# Patient Record
Sex: FEMALE | Race: WHITE | NOT HISPANIC OR LATINO | Employment: FULL TIME | URBAN - METROPOLITAN AREA
[De-identification: names, ages, dates, MRNs, and addresses within clinical notes are randomized per-mention and may not be internally consistent; named-entity substitution may affect disease eponyms.]

---

## 2023-07-21 PROBLEM — Z86.010 HISTORY OF COLONIC POLYPS: Status: ACTIVE | Noted: 2023-07-21

## 2023-07-21 PROBLEM — K58.9 IBS (IRRITABLE BOWEL SYNDROME): Status: ACTIVE | Noted: 2023-07-21

## 2023-07-21 PROBLEM — Z86.0100 HISTORY OF COLONIC POLYPS: Status: ACTIVE | Noted: 2023-07-21

## 2023-07-21 PROBLEM — M79.642 HAND PAIN, LEFT: Status: ACTIVE | Noted: 2023-07-21

## 2023-07-21 PROBLEM — M79.89 SWELLING OF RIGHT LOWER EXTREMITY: Status: ACTIVE | Noted: 2023-07-21

## 2023-07-21 PROBLEM — M75.82 TENDINITIS OF LEFT ROTATOR CUFF: Status: ACTIVE | Noted: 2023-07-21

## 2023-07-21 PROBLEM — I73.00 RAYNAUD PHENOMENON: Status: ACTIVE | Noted: 2023-07-21

## 2023-07-21 PROBLEM — R31.9 HEMATURIA, UNSPECIFIED: Status: ACTIVE | Noted: 2023-07-21

## 2023-07-21 PROBLEM — M20.002 DEVIATION OF FINGER OF LEFT HAND: Status: ACTIVE | Noted: 2023-07-21

## 2023-07-21 PROBLEM — M25.511 RIGHT SHOULDER PAIN: Status: ACTIVE | Noted: 2023-07-21

## 2023-07-21 PROBLEM — R20.0 RIGHT LEG NUMBNESS: Status: ACTIVE | Noted: 2023-07-21

## 2023-08-15 ASSESSMENT — PROMIS GLOBAL HEALTH SCALE
RATE_QUALITY_OF_LIFE: VERY GOOD
RATE_GENERAL_HEALTH: VERY GOOD
RATE_AVERAGE_FATIGUE: MILD
CARRYOUT_SOCIAL_ACTIVITIES: VERY GOOD
RATE_SOCIAL_SATISFACTION: VERY GOOD
EMOTIONAL_PROBLEMS: RARELY
RATE_MENTAL_HEALTH: VERY GOOD
RATE_AVERAGE_PAIN: 2
CARRYOUT_PHYSICAL_ACTIVITIES: COMPLETELY
RATE_PHYSICAL_HEALTH: VERY GOOD

## 2023-08-17 ENCOUNTER — OFFICE VISIT (OUTPATIENT)
Dept: PRIMARY CARE | Facility: CLINIC | Age: 53
End: 2023-08-17
Payer: COMMERCIAL

## 2023-08-17 VITALS
HEART RATE: 64 BPM | SYSTOLIC BLOOD PRESSURE: 122 MMHG | BODY MASS INDEX: 23.63 KG/M2 | HEIGHT: 66 IN | WEIGHT: 147 LBS | RESPIRATION RATE: 19 BRPM | DIASTOLIC BLOOD PRESSURE: 70 MMHG

## 2023-08-17 DIAGNOSIS — Z86.010 HISTORY OF COLONIC POLYPS: ICD-10-CM

## 2023-08-17 DIAGNOSIS — Z00.00 ROUTINE GENERAL MEDICAL EXAMINATION AT A HEALTH CARE FACILITY: Primary | ICD-10-CM

## 2023-08-17 DIAGNOSIS — E78.2 MIXED HYPERLIPIDEMIA: ICD-10-CM

## 2023-08-17 DIAGNOSIS — R20.0 RIGHT LEG NUMBNESS: ICD-10-CM

## 2023-08-17 DIAGNOSIS — Z13.29 SCREENING FOR THYROID DISORDER: ICD-10-CM

## 2023-08-17 PROBLEM — M25.511 RIGHT SHOULDER PAIN: Status: RESOLVED | Noted: 2023-07-21 | Resolved: 2023-08-17

## 2023-08-17 PROBLEM — M75.82 TENDINITIS OF LEFT ROTATOR CUFF: Status: RESOLVED | Noted: 2023-07-21 | Resolved: 2023-08-17

## 2023-08-17 PROBLEM — M79.642 HAND PAIN, LEFT: Status: RESOLVED | Noted: 2023-07-21 | Resolved: 2023-08-17

## 2023-08-17 PROBLEM — M79.89 SWELLING OF RIGHT LOWER EXTREMITY: Status: RESOLVED | Noted: 2023-07-21 | Resolved: 2023-08-17

## 2023-08-17 PROCEDURE — 1036F TOBACCO NON-USER: CPT | Performed by: INTERNAL MEDICINE

## 2023-08-17 PROCEDURE — 99396 PREV VISIT EST AGE 40-64: CPT | Performed by: INTERNAL MEDICINE

## 2023-08-17 PROCEDURE — 85027 COMPLETE CBC AUTOMATED: CPT

## 2023-08-17 PROCEDURE — 81001 URINALYSIS AUTO W/SCOPE: CPT

## 2023-08-17 PROCEDURE — 80053 COMPREHEN METABOLIC PANEL: CPT

## 2023-08-17 PROCEDURE — 84443 ASSAY THYROID STIM HORMONE: CPT

## 2023-08-17 PROCEDURE — 80061 LIPID PANEL: CPT

## 2023-08-17 ASSESSMENT — ENCOUNTER SYMPTOMS
HALLUCINATIONS: 0
POLYPHAGIA: 0
CONFUSION: 0
HEMATURIA: 0
CHOKING: 0
CONSTIPATION: 0
POLYDIPSIA: 0
NECK PAIN: 0
DIFFICULTY URINATING: 0
UNEXPECTED WEIGHT CHANGE: 0
CHEST TIGHTNESS: 0
BACK PAIN: 0
NECK STIFFNESS: 0
FLANK PAIN: 0
DIAPHORESIS: 0
FATIGUE: 0
ACTIVITY CHANGE: 0
ABDOMINAL PAIN: 0
EYE DISCHARGE: 0
AGITATION: 0
VOICE CHANGE: 0
HYPERACTIVE: 0
PHOTOPHOBIA: 0
HEADACHES: 0
WHEEZING: 0
COLOR CHANGE: 0
SINUS PAIN: 0
RHINORRHEA: 0
DYSPHORIC MOOD: 0
SHORTNESS OF BREATH: 0
SINUS PRESSURE: 0
STRIDOR: 0
RECTAL PAIN: 0
NUMBNESS: 0
DIARRHEA: 0
VOMITING: 0
FACIAL SWELLING: 0
FACIAL ASYMMETRY: 0
FEVER: 0
APNEA: 0
EYE ITCHING: 0
DYSURIA: 0
TROUBLE SWALLOWING: 0
NAUSEA: 0
ABDOMINAL DISTENTION: 0
MYALGIAS: 0
LIGHT-HEADEDNESS: 0
FREQUENCY: 0
SPEECH DIFFICULTY: 0
ADENOPATHY: 0
WEAKNESS: 0
BLOOD IN STOOL: 0
BRUISES/BLEEDS EASILY: 0
ARTHRALGIAS: 1
DIZZINESS: 0
APPETITE CHANGE: 0
DECREASED CONCENTRATION: 0
ANAL BLEEDING: 0
PALPITATIONS: 0
EYE REDNESS: 0
JOINT SWELLING: 0
COUGH: 0
SORE THROAT: 0
SEIZURES: 0
SLEEP DISTURBANCE: 0
WOUND: 0
CHILLS: 0
TREMORS: 0
NERVOUS/ANXIOUS: 0
EYE PAIN: 0

## 2023-08-17 NOTE — ASSESSMENT & PLAN NOTE
Last colonoscopy was in 2022  She has family history of colorectal cancer  She goes every 3 years
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
## 2023-08-17 NOTE — PATIENT INSTRUCTIONS
We did blood work today and will call with any abnormal results  Continue to see GYN annually and have mammograms as directed  Continue healthy diet and regular exercise for general health  Flu and covid boosters come fall if interested  Consider 2 part Shingles vaccine   Follow up here in 1 year-sooner if needed

## 2023-08-18 LAB
ALANINE AMINOTRANSFERASE (SGPT) (U/L) IN SER/PLAS: 13 U/L (ref 7–45)
ALBUMIN (G/DL) IN SER/PLAS: 4.9 G/DL (ref 3.4–5)
ALKALINE PHOSPHATASE (U/L) IN SER/PLAS: 64 U/L (ref 33–110)
ANION GAP IN SER/PLAS: 13 MMOL/L (ref 10–20)
APPEARANCE, URINE: CLEAR
ASPARTATE AMINOTRANSFERASE (SGOT) (U/L) IN SER/PLAS: 16 U/L (ref 9–39)
BILIRUBIN TOTAL (MG/DL) IN SER/PLAS: 1.8 MG/DL (ref 0–1.2)
BILIRUBIN, URINE: NEGATIVE
BLOOD, URINE: ABNORMAL
CALCIUM (MG/DL) IN SER/PLAS: 9.6 MG/DL (ref 8.6–10.6)
CARBON DIOXIDE, TOTAL (MMOL/L) IN SER/PLAS: 28 MMOL/L (ref 21–32)
CHLORIDE (MMOL/L) IN SER/PLAS: 102 MMOL/L (ref 98–107)
CHOLESTEROL (MG/DL) IN SER/PLAS: 180 MG/DL (ref 0–199)
CHOLESTEROL IN HDL (MG/DL) IN SER/PLAS: 50.2 MG/DL
CHOLESTEROL/HDL RATIO: 3.6
COLOR, URINE: COLORLESS
CREATININE (MG/DL) IN SER/PLAS: 0.88 MG/DL (ref 0.5–1.05)
ERYTHROCYTE DISTRIBUTION WIDTH (RATIO) BY AUTOMATED COUNT: 12.5 % (ref 11.5–14.5)
ERYTHROCYTE MEAN CORPUSCULAR HEMOGLOBIN CONCENTRATION (G/DL) BY AUTOMATED: 31.7 G/DL (ref 32–36)
ERYTHROCYTE MEAN CORPUSCULAR VOLUME (FL) BY AUTOMATED COUNT: 90 FL (ref 80–100)
ERYTHROCYTES (10*6/UL) IN BLOOD BY AUTOMATED COUNT: 4.82 X10E12/L (ref 4–5.2)
GFR FEMALE: 80 ML/MIN/1.73M2
GLUCOSE (MG/DL) IN SER/PLAS: 79 MG/DL (ref 74–99)
GLUCOSE, URINE: NEGATIVE MG/DL
HEMATOCRIT (%) IN BLOOD BY AUTOMATED COUNT: 43.5 % (ref 36–46)
HEMOGLOBIN (G/DL) IN BLOOD: 13.8 G/DL (ref 12–16)
KETONES, URINE: NEGATIVE MG/DL
LDL: 112 MG/DL (ref 0–99)
LEUKOCYTE ESTERASE, URINE: NEGATIVE
LEUKOCYTES (10*3/UL) IN BLOOD BY AUTOMATED COUNT: 6.6 X10E9/L (ref 4.4–11.3)
NITRITE, URINE: NEGATIVE
NRBC (PER 100 WBCS) BY AUTOMATED COUNT: 0 /100 WBC (ref 0–0)
PH, URINE: 8 (ref 5–8)
PLATELETS (10*3/UL) IN BLOOD AUTOMATED COUNT: 276 X10E9/L (ref 150–450)
POTASSIUM (MMOL/L) IN SER/PLAS: 4.4 MMOL/L (ref 3.5–5.3)
PROTEIN TOTAL: 7.1 G/DL (ref 6.4–8.2)
PROTEIN, URINE: NEGATIVE MG/DL
RBC, URINE: 1 /HPF (ref 0–5)
SODIUM (MMOL/L) IN SER/PLAS: 139 MMOL/L (ref 136–145)
SPECIFIC GRAVITY, URINE: 1 (ref 1–1.03)
THYROTROPIN (MIU/L) IN SER/PLAS BY DETECTION LIMIT <= 0.05 MIU/L: 1.58 MIU/L (ref 0.44–3.98)
TRIGLYCERIDE (MG/DL) IN SER/PLAS: 91 MG/DL (ref 0–149)
UREA NITROGEN (MG/DL) IN SER/PLAS: 15 MG/DL (ref 6–23)
UROBILINOGEN, URINE: <2 MG/DL (ref 0–1.9)
VLDL: 18 MG/DL (ref 0–40)
WBC, URINE: <1 /HPF (ref 0–5)

## 2024-06-12 ENCOUNTER — TELEPHONE (OUTPATIENT)
Dept: OBSTETRICS AND GYNECOLOGY | Facility: CLINIC | Age: 54
End: 2024-06-12

## 2024-06-12 ENCOUNTER — APPOINTMENT (OUTPATIENT)
Dept: OBSTETRICS AND GYNECOLOGY | Facility: CLINIC | Age: 54
End: 2024-06-12
Payer: COMMERCIAL

## 2024-06-12 NOTE — TELEPHONE ENCOUNTER
Called patient to reschedule appointment. Provider is in delivery. Left voicemail.    English Orozco

## 2024-08-16 ASSESSMENT — PROMIS GLOBAL HEALTH SCALE
CARRYOUT_SOCIAL_ACTIVITIES: EXCELLENT
RATE_GENERAL_HEALTH: VERY GOOD
RATE_AVERAGE_PAIN: 0
RATE_MENTAL_HEALTH: EXCELLENT
RATE_SOCIAL_SATISFACTION: EXCELLENT
EMOTIONAL_PROBLEMS: NEVER
RATE_PHYSICAL_HEALTH: VERY GOOD
RATE_QUALITY_OF_LIFE: VERY GOOD
CARRYOUT_PHYSICAL_ACTIVITIES: COMPLETELY

## 2024-08-22 ENCOUNTER — APPOINTMENT (OUTPATIENT)
Dept: PRIMARY CARE | Facility: CLINIC | Age: 54
End: 2024-08-22
Payer: COMMERCIAL

## 2024-08-22 ENCOUNTER — LAB (OUTPATIENT)
Dept: LAB | Facility: LAB | Age: 54
End: 2024-08-22
Payer: COMMERCIAL

## 2024-08-22 VITALS
HEART RATE: 68 BPM | BODY MASS INDEX: 24.92 KG/M2 | HEIGHT: 67 IN | RESPIRATION RATE: 12 BRPM | DIASTOLIC BLOOD PRESSURE: 82 MMHG | SYSTOLIC BLOOD PRESSURE: 130 MMHG | WEIGHT: 158.8 LBS

## 2024-08-22 DIAGNOSIS — E78.2 MIXED HYPERLIPIDEMIA: ICD-10-CM

## 2024-08-22 DIAGNOSIS — R31.21 ASYMPTOMATIC MICROSCOPIC HEMATURIA: ICD-10-CM

## 2024-08-22 DIAGNOSIS — Z12.31 ENCOUNTER FOR SCREENING MAMMOGRAM FOR MALIGNANT NEOPLASM OF BREAST: ICD-10-CM

## 2024-08-22 DIAGNOSIS — Z00.00 ROUTINE GENERAL MEDICAL EXAMINATION AT A HEALTH CARE FACILITY: ICD-10-CM

## 2024-08-22 DIAGNOSIS — Z13.29 SCREENING FOR THYROID DISORDER: ICD-10-CM

## 2024-08-22 DIAGNOSIS — Z00.00 ROUTINE GENERAL MEDICAL EXAMINATION AT A HEALTH CARE FACILITY: Primary | ICD-10-CM

## 2024-08-22 DIAGNOSIS — Z86.010 HISTORY OF COLONIC POLYPS: ICD-10-CM

## 2024-08-22 LAB
ALBUMIN SERPL BCP-MCNC: 4.5 G/DL (ref 3.4–5)
ALP SERPL-CCNC: 64 U/L (ref 33–110)
ALT SERPL W P-5'-P-CCNC: 13 U/L (ref 7–45)
ANION GAP SERPL CALC-SCNC: 12 MMOL/L (ref 10–20)
APPEARANCE UR: CLEAR
AST SERPL W P-5'-P-CCNC: 15 U/L (ref 9–39)
BILIRUB SERPL-MCNC: 1.4 MG/DL (ref 0–1.2)
BILIRUB UR STRIP.AUTO-MCNC: NEGATIVE MG/DL
BUN SERPL-MCNC: 21 MG/DL (ref 6–23)
CALCIUM SERPL-MCNC: 9.7 MG/DL (ref 8.6–10.6)
CHLORIDE SERPL-SCNC: 101 MMOL/L (ref 98–107)
CHOLEST SERPL-MCNC: 198 MG/DL (ref 0–199)
CHOLESTEROL/HDL RATIO: 3.8
CO2 SERPL-SCNC: 30 MMOL/L (ref 21–32)
COLOR UR: COLORLESS
CREAT SERPL-MCNC: 0.78 MG/DL (ref 0.5–1.05)
EGFRCR SERPLBLD CKD-EPI 2021: >90 ML/MIN/1.73M*2
ERYTHROCYTE [DISTWIDTH] IN BLOOD BY AUTOMATED COUNT: 12.2 % (ref 11.5–14.5)
GLUCOSE SERPL-MCNC: 89 MG/DL (ref 74–99)
GLUCOSE UR STRIP.AUTO-MCNC: NORMAL MG/DL
HCT VFR BLD AUTO: 41.6 % (ref 36–46)
HDLC SERPL-MCNC: 52.8 MG/DL
HGB BLD-MCNC: 13.6 G/DL (ref 12–16)
KETONES UR STRIP.AUTO-MCNC: NEGATIVE MG/DL
LDLC SERPL CALC-MCNC: 126 MG/DL
LEUKOCYTE ESTERASE UR QL STRIP.AUTO: NEGATIVE
MCH RBC QN AUTO: 28.9 PG (ref 26–34)
MCHC RBC AUTO-ENTMCNC: 32.7 G/DL (ref 32–36)
MCV RBC AUTO: 88 FL (ref 80–100)
NITRITE UR QL STRIP.AUTO: NEGATIVE
NON HDL CHOLESTEROL: 145 MG/DL (ref 0–149)
NRBC BLD-RTO: 0 /100 WBCS (ref 0–0)
PH UR STRIP.AUTO: 7 [PH]
PLATELET # BLD AUTO: 282 X10*3/UL (ref 150–450)
POTASSIUM SERPL-SCNC: 4.1 MMOL/L (ref 3.5–5.3)
PROT SERPL-MCNC: 7.2 G/DL (ref 6.4–8.2)
PROT UR STRIP.AUTO-MCNC: NEGATIVE MG/DL
RBC # BLD AUTO: 4.71 X10*6/UL (ref 4–5.2)
RBC # UR STRIP.AUTO: ABNORMAL /UL
RBC #/AREA URNS AUTO: NORMAL /HPF
SODIUM SERPL-SCNC: 139 MMOL/L (ref 136–145)
SP GR UR STRIP.AUTO: 1
TRIGL SERPL-MCNC: 96 MG/DL (ref 0–149)
TSH SERPL-ACNC: 1.39 MIU/L (ref 0.44–3.98)
UROBILINOGEN UR STRIP.AUTO-MCNC: NORMAL MG/DL
VLDL: 19 MG/DL (ref 0–40)
WBC # BLD AUTO: 7.2 X10*3/UL (ref 4.4–11.3)
WBC #/AREA URNS AUTO: NORMAL /HPF

## 2024-08-22 PROCEDURE — 3008F BODY MASS INDEX DOCD: CPT | Performed by: INTERNAL MEDICINE

## 2024-08-22 PROCEDURE — 1036F TOBACCO NON-USER: CPT | Performed by: INTERNAL MEDICINE

## 2024-08-22 PROCEDURE — 36415 COLL VENOUS BLD VENIPUNCTURE: CPT

## 2024-08-22 PROCEDURE — 85027 COMPLETE CBC AUTOMATED: CPT

## 2024-08-22 PROCEDURE — 80053 COMPREHEN METABOLIC PANEL: CPT

## 2024-08-22 PROCEDURE — 80061 LIPID PANEL: CPT

## 2024-08-22 PROCEDURE — 81001 URINALYSIS AUTO W/SCOPE: CPT

## 2024-08-22 PROCEDURE — 99396 PREV VISIT EST AGE 40-64: CPT | Performed by: INTERNAL MEDICINE

## 2024-08-22 PROCEDURE — 84443 ASSAY THYROID STIM HORMONE: CPT

## 2024-08-22 ASSESSMENT — ENCOUNTER SYMPTOMS
STRIDOR: 0
JOINT SWELLING: 0
BACK PAIN: 0
ADENOPATHY: 0
CHILLS: 0
EYE PAIN: 0
BLOOD IN STOOL: 0
COLOR CHANGE: 0
DIFFICULTY URINATING: 0
TROUBLE SWALLOWING: 0
ACTIVITY CHANGE: 0
POLYDIPSIA: 0
NERVOUS/ANXIOUS: 0
AGITATION: 0
DECREASED CONCENTRATION: 0
APPETITE CHANGE: 0
FEVER: 0
RECTAL PAIN: 0
DIARRHEA: 0
PHOTOPHOBIA: 0
FREQUENCY: 0
ABDOMINAL PAIN: 0
NECK STIFFNESS: 0
FLANK PAIN: 0
HEADACHES: 0
MYALGIAS: 0
EYE REDNESS: 0
WOUND: 0
CONSTIPATION: 0
ANAL BLEEDING: 0
HEMATURIA: 0
SINUS PAIN: 0
EYE ITCHING: 0
CONFUSION: 0
DIZZINESS: 0
SLEEP DISTURBANCE: 0
SINUS PRESSURE: 0
FATIGUE: 0
SHORTNESS OF BREATH: 0
SPEECH DIFFICULTY: 0
UNEXPECTED WEIGHT CHANGE: 0
SORE THROAT: 0
PALPITATIONS: 0
RHINORRHEA: 0
BRUISES/BLEEDS EASILY: 0
POLYPHAGIA: 0
ARTHRALGIAS: 0
WHEEZING: 0
DIAPHORESIS: 0
CHOKING: 0
FACIAL SWELLING: 0
APNEA: 0
CHEST TIGHTNESS: 0
HALLUCINATIONS: 0
VOMITING: 0
VOICE CHANGE: 0
LIGHT-HEADEDNESS: 0
EYE DISCHARGE: 0
HYPERACTIVE: 0
NECK PAIN: 0
COUGH: 0
TREMORS: 0
NAUSEA: 0
SEIZURES: 0
DYSPHORIC MOOD: 0
ABDOMINAL DISTENTION: 0
DYSURIA: 0
NUMBNESS: 1
FACIAL ASYMMETRY: 0
WEAKNESS: 0

## 2024-08-22 NOTE — PROGRESS NOTES
Subjective   Patient ID: Bre Suarez is a 53 y.o. female who presents for Annual Exam.    HPI  Pt here for full physical.  Her weight is up due to more stress at home- had 2 major surgeries that caused her to have to caretake/not be able to exercise.      She had normal mammogram in March of 2023.  No breast issues.    She sees Dr. Michaels for GYN care-last pap was normal in 2022.  She has appt in 9/18 for her annual.  No pelvic or urinary issues.      She had colonoscopy in 2022 and had large polyps-noted to return in 3 years.  No bowel or other GI issues.      She sees derm regularly-had visit for screening in April.  Does have few lesions burned off each time.        Review of Systems   Constitutional:  Negative for activity change, appetite change, chills, diaphoresis, fatigue, fever and unexpected weight change.   HENT:  Negative for congestion, dental problem, drooling, ear discharge, ear pain, facial swelling, hearing loss, mouth sores, nosebleeds, postnasal drip, rhinorrhea, sinus pressure, sinus pain, sneezing, sore throat, tinnitus, trouble swallowing and voice change.    Eyes:  Negative for photophobia, pain, discharge, redness, itching and visual disturbance (wears glasses-due for exam).   Respiratory:  Negative for apnea, cough, choking, chest tightness, shortness of breath, wheezing and stridor.    Cardiovascular:  Negative for chest pain, palpitations and leg swelling.   Gastrointestinal:  Negative for abdominal distention, abdominal pain, anal bleeding, blood in stool, constipation, diarrhea, nausea, rectal pain and vomiting.   Endocrine: Negative for cold intolerance, heat intolerance, polydipsia, polyphagia and polyuria.   Genitourinary:  Negative for decreased urine volume, difficulty urinating, dyspareunia, dysuria, enuresis, flank pain, frequency, genital sores, hematuria, menstrual problem, pelvic pain, urgency, vaginal bleeding, vaginal discharge and vaginal pain.  "  Musculoskeletal:  Negative for arthralgias, back pain, gait problem, joint swelling, myalgias, neck pain and neck stiffness.   Skin:  Negative for color change, pallor, rash and wound.   Allergic/Immunologic: Negative for environmental allergies, food allergies and immunocompromised state.   Neurological:  Positive for numbness (right leg at times-chronic from accident in past). Negative for dizziness, tremors, seizures, syncope, facial asymmetry, speech difficulty, weakness, light-headedness and headaches.   Hematological:  Negative for adenopathy. Does not bruise/bleed easily.   Psychiatric/Behavioral:  Negative for agitation, behavioral problems, confusion, decreased concentration, dysphoric mood, hallucinations, self-injury, sleep disturbance and suicidal ideas. The patient is not nervous/anxious and is not hyperactive.        Objective   /82 (BP Location: Right arm, Patient Position: Sitting)   Pulse 68   Resp 12   Ht 1.692 m (5' 6.6\")   Wt 72 kg (158 lb 12.8 oz)   BMI 25.17 kg/m²      Physical Exam  Constitutional:       Appearance: Normal appearance.   HENT:      Head: Normocephalic and atraumatic.      Right Ear: Tympanic membrane, ear canal and external ear normal. There is no impacted cerumen.      Left Ear: Tympanic membrane, ear canal and external ear normal. There is no impacted cerumen.      Nose: Nose normal. No congestion or rhinorrhea.      Mouth/Throat:      Mouth: Mucous membranes are moist.      Pharynx: Oropharynx is clear. No oropharyngeal exudate or posterior oropharyngeal erythema.   Eyes:      Extraocular Movements: Extraocular movements intact.      Conjunctiva/sclera: Conjunctivae normal.      Pupils: Pupils are equal, round, and reactive to light.   Neck:      Vascular: No carotid bruit.   Cardiovascular:      Rate and Rhythm: Normal rate and regular rhythm.      Pulses: Normal pulses.      Heart sounds: Normal heart sounds. No murmur heard.  Pulmonary:      Effort: Pulmonary " effort is normal. No respiratory distress.      Breath sounds: Normal breath sounds. No wheezing, rhonchi or rales.   Abdominal:      General: Abdomen is flat. Bowel sounds are normal. There is no distension.      Palpations: Abdomen is soft.      Tenderness: There is no abdominal tenderness.      Hernia: No hernia is present.   Musculoskeletal:         General: No swelling or tenderness. Normal range of motion.      Cervical back: Normal range of motion and neck supple.      Right lower leg: No edema.      Left lower leg: No edema.   Lymphadenopathy:      Cervical: No cervical adenopathy.   Skin:     General: Skin is warm and dry.      Findings: No lesion or rash.   Neurological:      General: No focal deficit present.      Mental Status: She is alert and oriented to person, place, and time.      Cranial Nerves: No cranial nerve deficit.      Sensory: No sensory deficit.      Motor: No weakness.   Psychiatric:         Mood and Affect: Mood normal.         Behavior: Behavior normal.         Thought Content: Thought content normal.         Judgment: Judgment normal.         Assessment/Plan   Problem List Items Addressed This Visit       Hematuria, unspecified     Will have her do repeat urine today to assess  Asymptomatic          Relevant Orders    Urinalysis with Reflex Microscopic    History of colonic polyps     Repeat colonoscopy due 7/2025         Mixed hyperlipidemia     Not on meds  Repeat lipids today  Diet modifications          Relevant Orders    Lipid Panel     Other Visit Diagnoses       Routine general medical examination at a health care facility    -  Primary    Relevant Orders    Comprehensive Metabolic Panel    CBC    Follow Up In Primary Care - Health Maintenance    Encounter for screening mammogram for malignant neoplasm of breast        Relevant Orders    BI mammo bilateral screening tomosynthesis    Screening for thyroid disorder        Relevant Orders    TSH with reflex to Free T4 if abnormal

## 2024-08-22 NOTE — PATIENT INSTRUCTIONS
Get fasting blood work and urine done today-we will call if anything is abnormal  Consider the 2 part shingles vaccine-can have this done here  Continue healthy diet and get back into regular exercise-goal of 150 minutes/week  See GYN in 9/2024 when scheduled  Call and schedule mammogram now  Colonoscopy will be due next year (2025 in July)  Consider flu/covid boosters come fall if interested  Follow up here in 1 year

## 2024-08-29 ENCOUNTER — HOSPITAL ENCOUNTER (OUTPATIENT)
Dept: RADIOLOGY | Facility: CLINIC | Age: 54
Discharge: HOME | End: 2024-08-29
Payer: COMMERCIAL

## 2024-08-29 DIAGNOSIS — Z12.31 ENCOUNTER FOR SCREENING MAMMOGRAM FOR MALIGNANT NEOPLASM OF BREAST: ICD-10-CM

## 2024-08-29 PROCEDURE — 77063 BREAST TOMOSYNTHESIS BI: CPT | Performed by: RADIOLOGY

## 2024-08-29 PROCEDURE — 77067 SCR MAMMO BI INCL CAD: CPT

## 2024-08-29 PROCEDURE — 77067 SCR MAMMO BI INCL CAD: CPT | Performed by: RADIOLOGY

## 2024-09-18 ENCOUNTER — APPOINTMENT (OUTPATIENT)
Dept: OBSTETRICS AND GYNECOLOGY | Facility: CLINIC | Age: 54
End: 2024-09-18
Payer: COMMERCIAL

## 2024-09-18 VITALS
HEIGHT: 67 IN | BODY MASS INDEX: 25.11 KG/M2 | SYSTOLIC BLOOD PRESSURE: 120 MMHG | DIASTOLIC BLOOD PRESSURE: 82 MMHG | WEIGHT: 160 LBS

## 2024-09-18 DIAGNOSIS — Z01.419 ENCOUNTER FOR GYNECOLOGICAL EXAMINATION WITHOUT ABNORMAL FINDING: Primary | ICD-10-CM

## 2024-09-18 PROCEDURE — 3008F BODY MASS INDEX DOCD: CPT | Performed by: OBSTETRICS & GYNECOLOGY

## 2024-09-18 PROCEDURE — 1036F TOBACCO NON-USER: CPT | Performed by: OBSTETRICS & GYNECOLOGY

## 2024-09-18 PROCEDURE — 99396 PREV VISIT EST AGE 40-64: CPT | Performed by: OBSTETRICS & GYNECOLOGY

## 2024-09-18 NOTE — PROGRESS NOTES
Subjective   Bre Suarez is a 53 y.o. female here for a routine exam.  She has no postmenopausal bleeding or pelvic pain.  No dysuria, no change in bowel habits or vaginal discharge.  She is current on her colonoscopy, done every 3 years due to polyps.    Personal health questionnaire reviewed: yes.     Gynecologic History  Patient's last menstrual period was 2018.  Contraception: post menopausal status  Last Pap: 22. Results were: normal  Last mammogram: 24. Results were: normal    Obstetric History  OB History    Para Term  AB Living   1 1       1   SAB IAB Ectopic Multiple Live Births           1      # Outcome Date GA Lbr Meir/2nd Weight Sex Type Anes PTL Lv   1 Para                Objective   Constitutional: Alert and in no acute distress. Well developed, well nourished.   Head and Face: Head and face: Normal.    Eyes: Normal external exam - nonicteric sclera, extraocular movements intact (EOMI) and no ptosis.   Neck: No neck asymmetry. Supple. Thyroid not enlarged and there were no palpable thyroid nodules.    Pulmonary: No respiratory distress.   Chest: Breasts: Normal appearance, no nipple discharge and no skin changes. Palpation of breasts and axillae: No palpable mass and no axillary lymphadenopathy.   Abdomen: Soft nontender; no abdominal mass palpated. No organomegaly. No hernias.   Genitourinary: External genitalia: Normal. No inguinal lymphadenopathy. Bartholin's Urethral and Skenes Glands: Normal. Urethra: Normal.  Bladder: Normal on palpation. Vagina: Normal. Cervix: Normal.  Uterus: Normal.  Right Adnexa/parametria: Normal.  Left Adnexa/parametria: Normal.  Inspection of Perianal Area: Normal.   Musculoskeletal: No joint swelling seen, normal movements of all extremities.   Skin: Normal skin color and pigmentation, normal skin turgor, and no rash.   Neurologic: Non-focal. Grossly intact.   Psychiatric: Alert and oriented x 3. Affect normal to patient baseline. Mood:  Appropriate.  Physical Exam     Assessment/Plan   Healthy female exam.  This is a 53-year-old female with a normal exam.  No Pap smear was sent, she is high risk HPV negative in 2022.    Her routine mammogram will be due in August 2025.    I will see her in 1 year.  Mammogram ordered.

## 2024-11-15 ENCOUNTER — APPOINTMENT (OUTPATIENT)
Dept: PRIMARY CARE | Facility: CLINIC | Age: 54
End: 2024-11-15
Payer: COMMERCIAL

## 2024-11-15 PROCEDURE — 90750 HZV VACC RECOMBINANT IM: CPT | Performed by: INTERNAL MEDICINE

## 2024-11-15 PROCEDURE — 90471 IMMUNIZATION ADMIN: CPT | Performed by: INTERNAL MEDICINE

## 2025-02-21 ENCOUNTER — APPOINTMENT (OUTPATIENT)
Dept: PRIMARY CARE | Facility: CLINIC | Age: 55
End: 2025-02-21
Payer: COMMERCIAL

## 2025-02-21 PROCEDURE — 90750 HZV VACC RECOMBINANT IM: CPT | Performed by: INTERNAL MEDICINE

## 2025-02-21 PROCEDURE — 90471 IMMUNIZATION ADMIN: CPT | Performed by: INTERNAL MEDICINE

## 2025-02-21 NOTE — PROGRESS NOTES
Patient in clinic today for 2nd shingles vaccine- given in the right deltoid. Patient tolerated well. Encouraged patient to reach us at the office with any other questions or concerns she may have, patient understands.

## 2025-08-25 ENCOUNTER — APPOINTMENT (OUTPATIENT)
Dept: PRIMARY CARE | Facility: CLINIC | Age: 55
End: 2025-08-25
Payer: COMMERCIAL

## 2025-09-02 ENCOUNTER — APPOINTMENT (OUTPATIENT)
Dept: RADIOLOGY | Facility: CLINIC | Age: 55
End: 2025-09-02
Payer: COMMERCIAL

## 2025-09-24 ENCOUNTER — APPOINTMENT (OUTPATIENT)
Dept: OBSTETRICS AND GYNECOLOGY | Facility: CLINIC | Age: 55
End: 2025-09-24
Payer: COMMERCIAL

## 2025-09-25 ENCOUNTER — APPOINTMENT (OUTPATIENT)
Dept: PRIMARY CARE | Facility: CLINIC | Age: 55
End: 2025-09-25
Payer: COMMERCIAL